# Patient Record
Sex: FEMALE | Race: WHITE | NOT HISPANIC OR LATINO | URBAN - METROPOLITAN AREA
[De-identification: names, ages, dates, MRNs, and addresses within clinical notes are randomized per-mention and may not be internally consistent; named-entity substitution may affect disease eponyms.]

---

## 2020-11-24 ENCOUNTER — OFFICE VISIT (OUTPATIENT)
Dept: ORTHOPEDICS | Facility: CLINIC | Age: 30
End: 2020-11-24
Payer: OTHER GOVERNMENT

## 2020-11-24 VITALS — TEMPERATURE: 98 F | WEIGHT: 205 LBS | HEIGHT: 60 IN | BODY MASS INDEX: 40.25 KG/M2

## 2020-11-24 DIAGNOSIS — S82.832A CLOSED FRACTURE OF HEAD OF LEFT FIBULA, INITIAL ENCOUNTER: Primary | ICD-10-CM

## 2020-11-24 PROCEDURE — 99202 PR OFFICE/OUTPT VISIT, NEW, LEVL II, 15-29 MIN: ICD-10-PCS | Mod: S$GLB,,, | Performed by: ORTHOPAEDIC SURGERY

## 2020-11-24 PROCEDURE — 99202 OFFICE O/P NEW SF 15 MIN: CPT | Mod: S$GLB,,, | Performed by: ORTHOPAEDIC SURGERY

## 2020-11-24 RX ORDER — CITALOPRAM 10 MG/1
TABLET ORAL
COMMUNITY
Start: 2020-11-12

## 2020-11-24 RX ORDER — METRONIDAZOLE 7.5 MG/G
GEL VAGINAL
COMMUNITY
Start: 2020-10-13

## 2020-11-24 RX ORDER — FLUCONAZOLE 150 MG/1
TABLET ORAL
COMMUNITY
Start: 2020-10-13

## 2020-11-24 RX ORDER — HYDROCODONE BITARTRATE AND ACETAMINOPHEN 7.5; 325 MG/1; MG/1
TABLET ORAL
COMMUNITY
Start: 2020-11-09

## 2020-11-24 RX ORDER — METHYLPHENIDATE HYDROCHLORIDE 18 MG/1
TABLET ORAL
COMMUNITY
Start: 2020-11-12

## 2020-11-24 NOTE — PROGRESS NOTES
Subjective:      Patient ID: Lianet Brunner is a 30 y.o. female.    Chief Complaint: Pain of the Left Knee    HPI 30-year-old lady fell from a step ladder 2 weeks ago and injured her left lower extremity.  She comes in complaining of left ankle pain as well as pain along the lateral aspect of her left knee.  She was seen at a local emergency room where x-rays were taken which show a nondisplaced fracture of the left fibular head.    Review of Systems   Constitution: Negative for fever and weight loss.   Cardiovascular: Negative for chest pain and leg swelling.   Musculoskeletal: Positive for joint pain. Negative for arthritis, joint swelling, muscle weakness and stiffness.   Gastrointestinal: Negative for change in bowel habit.   Genitourinary: Negative for bladder incontinence and hematuria.   Neurological: Negative for focal weakness, numbness, paresthesias and sensory change.         Objective:      Active and passive range of motion of the left knee is normal.  There is no effusion and no pathologic laxity.  She is tender with palpation of the fibular head.  Examination of the left ankle shows mild swelling.  She has slight tenderness with palpation of the anterior talofibular ligament.  She has a negative talar tilt a negative anterior drawer test.  She is neurovascularly intact.      Ortho/SPM Exam            Assessment:       Encounter Diagnosis   Name Primary?    Closed fracture of head of left fibula, initial encounter Yes          Plan:       Lianet was seen today for pain.    Diagnoses and all orders for this visit:    Closed fracture of head of left fibula, initial encounter     Recommend symptomatic treatment.  She was be seen back in 3 weeks for final films if still having any symptoms.

## 2020-12-17 ENCOUNTER — OFFICE VISIT (OUTPATIENT)
Dept: ORTHOPEDICS | Facility: CLINIC | Age: 30
End: 2020-12-17
Payer: OTHER GOVERNMENT

## 2020-12-17 DIAGNOSIS — S82.832A CLOSED FRACTURE OF HEAD OF LEFT FIBULA, INITIAL ENCOUNTER: Primary | ICD-10-CM

## 2020-12-17 PROCEDURE — 99024 POSTOP FOLLOW-UP VISIT: CPT | Mod: S$GLB,,, | Performed by: ORTHOPAEDIC SURGERY

## 2020-12-17 PROCEDURE — 99024 PR POST-OP FOLLOW-UP VISIT: ICD-10-PCS | Mod: S$GLB,,, | Performed by: ORTHOPAEDIC SURGERY

## 2020-12-17 NOTE — PROGRESS NOTES
Subjective:      Patient ID: Lianet Brunner is a 30 y.o. female.    Chief Complaint: Pain of the Left Knee    HPI patient is here for follow-up from her fibular head fracture.  She has minimal discomfort.  She does have some low back pain since her fall    ROS    unchanged from prior visit  Objective:        Active and passive range of motion of the left ankle and knee is normal.  She has no tenderness with palpation of the fracture site.    Ortho/SPM Exam            Assessment:       Encounter Diagnosis   Name Primary?    Closed fracture of head of left fibula, initial encounter Yes          Plan:       Lianet was seen today for pain.    Diagnoses and all orders for this visit:    Closed fracture of head of left fibula, initial encounter      Recommend symptomatic treatment of ice rest and ibuprofen.  Return p.r.n.